# Patient Record
Sex: FEMALE | Race: WHITE | NOT HISPANIC OR LATINO | Employment: PART TIME | URBAN - METROPOLITAN AREA
[De-identification: names, ages, dates, MRNs, and addresses within clinical notes are randomized per-mention and may not be internally consistent; named-entity substitution may affect disease eponyms.]

---

## 2019-03-19 ENCOUNTER — TELEPHONE (OUTPATIENT)
Dept: UROLOGY | Facility: AMBULATORY SURGERY CENTER | Age: 19
End: 2019-03-19

## 2019-03-19 NOTE — TELEPHONE ENCOUNTER
Reason for appointment/Complaint/Diagnosis : kidney stone symptoms    Insurance:     History of Cancer? no                       If yes, what kind? Previous urologist?     None                  Records requested/where? No     Outside testing/where? No    Location Preference for office visit?  [place] or [place]

## 2019-03-27 ENCOUNTER — OFFICE VISIT (OUTPATIENT)
Dept: UROLOGY | Facility: MEDICAL CENTER | Age: 19
End: 2019-03-27
Payer: OTHER GOVERNMENT

## 2019-03-27 VITALS
HEART RATE: 83 BPM | DIASTOLIC BLOOD PRESSURE: 60 MMHG | BODY MASS INDEX: 24.17 KG/M2 | SYSTOLIC BLOOD PRESSURE: 90 MMHG | WEIGHT: 128 LBS | HEIGHT: 61 IN

## 2019-03-27 DIAGNOSIS — N20.0 KIDNEY STONES: Primary | ICD-10-CM

## 2019-03-27 LAB
POST-VOID RESIDUAL VOLUME, ML POC: 0 ML
SL AMB  POCT GLUCOSE, UA: ABNORMAL
SL AMB LEUKOCYTE ESTERASE,UA: ABNORMAL
SL AMB POCT BILIRUBIN,UA: ABNORMAL
SL AMB POCT BLOOD,UA: ABNORMAL
SL AMB POCT CLARITY,UA: CLEAR
SL AMB POCT COLOR,UA: YELLOW
SL AMB POCT KETONES,UA: ABNORMAL
SL AMB POCT NITRITE,UA: ABNORMAL
SL AMB POCT PH,UA: 7
SL AMB POCT SPECIFIC GRAVITY,UA: 1.02
SL AMB POCT URINE PROTEIN: ABNORMAL
SL AMB POCT UROBILINOGEN: 0.2

## 2019-03-27 PROCEDURE — 99203 OFFICE O/P NEW LOW 30 MIN: CPT | Performed by: UROLOGY

## 2019-03-27 PROCEDURE — 81003 URINALYSIS AUTO W/O SCOPE: CPT | Performed by: UROLOGY

## 2019-03-27 PROCEDURE — 51798 US URINE CAPACITY MEASURE: CPT | Performed by: UROLOGY

## 2019-03-27 RX ORDER — VENLAFAXINE 100 MG/1
TABLET ORAL
Refills: 5 | COMMUNITY
Start: 2019-02-06

## 2019-03-27 RX ORDER — CIPROFLOXACIN HYDROCHLORIDE 3.5 MG/ML
SOLUTION/ DROPS TOPICAL
COMMUNITY
Start: 2019-02-16 | End: 2019-03-27 | Stop reason: ALTCHOICE

## 2019-03-27 RX ORDER — AMOXICILLIN AND CLAVULANATE POTASSIUM 875; 125 MG/1; MG/1
TABLET, FILM COATED ORAL
Refills: 0 | COMMUNITY
Start: 2019-03-08 | End: 2019-03-27 | Stop reason: ALTCHOICE

## 2019-03-27 RX ORDER — NORETHINDRONE ACETATE/ETHINYL ESTRADIOL AND FERROUS FUMARATE 1.5-30(21)
KIT ORAL
Refills: 4 | COMMUNITY
Start: 2019-02-27

## 2019-03-27 RX ORDER — SULFAMETHOXAZOLE AND TRIMETHOPRIM 800; 160 MG/1; MG/1
TABLET ORAL
COMMUNITY
Start: 2019-03-17 | End: 2019-03-27 | Stop reason: ALTCHOICE

## 2019-03-27 RX ORDER — ONDANSETRON 4 MG/1
TABLET, ORALLY DISINTEGRATING ORAL
Refills: 0 | COMMUNITY
Start: 2019-03-20

## 2019-03-27 NOTE — ASSESSMENT & PLAN NOTE
The patient's illness with relapse in February was not due to the 2 mm stone in her left kidney  The diagnosis of a kidney stone is based on a KUB film alone  I will order renal ultrasound to get a more complete anatomical survey of the kidneys for stones  While a CT scan would be even more definitive, and a 77-year-old woman, I prefer to avoid the radiation exposure unless there is a compelling reason to get the CT scan

## 2019-03-27 NOTE — PROGRESS NOTES
Assessment/Plan:    Kidney stones  The patient's illness with relapse in February was not due to the 2 mm stone in her left kidney  The diagnosis of a kidney stone is based on a KUB film alone  I will order renal ultrasound to get a more complete anatomical survey of the kidneys for stones  While a CT scan would be even more definitive, and a 49-year-old woman, I prefer to avoid the radiation exposure unless there is a compelling reason to get the CT scan  Diagnoses and all orders for this visit:    Kidney stones  -     POCT urine dip auto non-scope  -     POCT Measure PVR  -     US retroperitoneal / kidney limited; Future    Other orders  -     Discontinue: amoxicillin-clavulanate (AUGMENTIN) 875-125 mg per tablet  -     DORINA FE 1 5/30 1 5-30 MG-MCG tablet  -     ondansetron (ZOFRAN-ODT) 4 mg disintegrating tablet  -     venlafaxine (EFFEXOR) 100 MG tablet  -     Discontinue: ciprofloxacin (CILOXAN) 0 3 % ophthalmic solution  -     Discontinue: sulfamethoxazole-trimethoprim (BACTRIM DS) 800-160 mg per tablet          Subjective:      Patient ID: Prudencio Sarah is a 19 y o  female  HPI  Renal Stone:  Feb 2019:  No fever  Nauseated with vomiting, urinary frequency for moderate amts  Pt was sick for over a week  No back pain or dysuria  No prior attacks  The patient works as a nurse's , 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  Treated with Bactrim at [facility]gent [facility] with Bactrim for possible urinary tract infection  Urinalysis that day shows microscopic blood but minimal white cells     An x-ray at Urgent Care  Report indicates a 2 mm stone in left lower pole  No personal hx of stones  PGF had stones  Urinary frequency:  The patient reports that she double voids fairly often and sometimes feels that she is unable to empty her bladder  PVR = 0  Accompaneid by [employer] Weems      The following portions of the patient's history were reviewed and updated as appropriate: allergies, current medications, past family history, past medical history, past social history, past surgical history and problem list     Review of Systems   Constitutional: Negative for activity change and fatigue  Respiratory: Negative for shortness of breath and wheezing  Cardiovascular: Negative for chest pain  Gastrointestinal: Negative for abdominal pain  Genitourinary: Negative for difficulty urinating, dysuria, frequency, hematuria and urgency  Musculoskeletal: Negative for back pain and gait problem  Skin: Negative  Allergic/Immunologic: Negative  Neurological: Negative  Psychiatric/Behavioral: Negative  Objective:      BP 90/60   Pulse 83   Ht 5' 1" (1 549 m)   Wt 58 1 kg (128 lb)   BMI 24 19 kg/m²          Physical Exam   Constitutional: She is oriented to person, place, and time  She appears well-developed and well-nourished  HENT:   Head: Normocephalic and atraumatic  Neck: Normal range of motion  Neck supple  Cardiovascular: Normal rate, regular rhythm and normal heart sounds  Pulmonary/Chest: Effort normal and breath sounds normal    Abdominal: Soft  Bowel sounds are normal  She exhibits no mass  There is no tenderness  There is no guarding  No CVA tenderness on either side  Musculoskeletal: Normal range of motion  Neurological: She is alert and oriented to person, place, and time  She has normal reflexes  Psychiatric: She has a normal mood and affect   Her behavior is normal  Judgment and thought content normal

## 2019-04-01 ENCOUNTER — HOSPITAL ENCOUNTER (OUTPATIENT)
Dept: ULTRASOUND IMAGING | Facility: HOSPITAL | Age: 19
Discharge: HOME | End: 2019-04-01
Attending: UROLOGY
Payer: OTHER GOVERNMENT

## 2019-04-01 DIAGNOSIS — N20.0 KIDNEY STONES: ICD-10-CM

## 2019-04-01 PROCEDURE — 76770 US EXAM ABDO BACK WALL COMP: CPT

## 2019-04-08 ENCOUNTER — TELEPHONE (OUTPATIENT)
Dept: UROLOGY | Facility: MEDICAL CENTER | Age: 19
End: 2019-04-08

## 2019-12-16 ENCOUNTER — OPTICAL OFFICE (OUTPATIENT)
Dept: URBAN - NONMETROPOLITAN AREA CLINIC 4 | Facility: CLINIC | Age: 19
Setting detail: OPHTHALMOLOGY
End: 2019-12-16
Payer: COMMERCIAL

## 2019-12-16 ENCOUNTER — DOCTOR'S OFFICE (OUTPATIENT)
Dept: URBAN - METROPOLITAN AREA CLINIC 125 | Facility: CLINIC | Age: 19
Setting detail: OPHTHALMOLOGY
End: 2019-12-16
Payer: COMMERCIAL

## 2019-12-16 DIAGNOSIS — H52.223: ICD-10-CM

## 2019-12-16 DIAGNOSIS — H52.13: ICD-10-CM

## 2019-12-16 PROCEDURE — 92015 DETERMINE REFRACTIVE STATE: CPT | Performed by: OPTOMETRIST

## 2019-12-16 PROCEDURE — 92310 CONTACT LENS FITTING OU: CPT | Performed by: OPTOMETRIST

## 2019-12-16 PROCEDURE — V2020 VISION SVCS FRAMES PURCHASES: HCPCS | Performed by: OPTOMETRIST

## 2019-12-16 PROCEDURE — 92004 COMPRE OPH EXAM NEW PT 1/>: CPT | Performed by: OPTOMETRIST

## 2019-12-16 PROCEDURE — V2103 SPHEROCYLINDR 4.00D/12-2.00D: HCPCS | Performed by: OPTOMETRIST

## 2019-12-16 ASSESSMENT — REFRACTION_CURRENTRX
OD_OVR_VA: 20/
OS_OVR_VA: 20/
OS_CYLINDER: 0.00
OD_CYLINDER: 0.00
OS_OVR_VA: 20/
OD_OVR_VA: 20/
OS_AXIS: 180
OS_OVR_VA: 20/
OD_OVR_VA: 20/
OD_SPHERE: -0.50
OS_SPHERE: -0.75
OD_AXIS: 180

## 2019-12-16 ASSESSMENT — REFRACTION_MANIFEST
OS_VA2: 20/
OS_VA1: 20/20
OD_VA1: 20/20
OS_SPHERE: -0.75
OS_VA3: 20/
OS_VA3: 20/
OD_VA3: 20/
OD_VA1: 20/
OS_VA1: 20/
OD_SPHERE: -0.50
OS_CYLINDER: -0.50
OD_AXIS: 090
OD_VA2: 20/
OD_CYLINDER: -0.50
OU_VA: 20/
OS_AXIS: 075
OD_VA3: 20/
OS_VA2: 20/
OU_VA: 20/20
OD_VA2: 20/

## 2019-12-16 ASSESSMENT — REFRACTION_AUTOREFRACTION
OS_CYLINDER: -0.50
OS_AXIS: 070
OD_AXIS: 095
OD_SPHERE: -0.50
OD_CYLINDER: -0.75
OS_SPHERE: -1.00

## 2019-12-16 ASSESSMENT — AXIALLENGTH_DERIVED
OD_AL: 24.1284
OD_AL: 24.0776
OS_AL: 23.8935
OS_AL: 23.9938

## 2019-12-16 ASSESSMENT — KERATOMETRY
OS_K1POWER_DIOPTERS: 43.25
OS_K2POWER_DIOPTERS: 44.25
OD_K2POWER_DIOPTERS: 43.50
OD_AXISANGLE_DEGREES: 172
OS_AXISANGLE_DEGREES: 174
OD_K1POWER_DIOPTERS: 42.50

## 2019-12-16 ASSESSMENT — SPHEQUIV_DERIVED
OS_SPHEQUIV: -1
OD_SPHEQUIV: -0.75
OD_SPHEQUIV: -0.875
OS_SPHEQUIV: -1.25

## 2019-12-16 ASSESSMENT — VISUAL ACUITY
OS_BCVA: 20/25
OD_BCVA: 20/25

## 2019-12-16 ASSESSMENT — CONFRONTATIONAL VISUAL FIELD TEST (CVF)
OS_FINDINGS: FULL
OD_FINDINGS: FULL

## 2020-04-01 ENCOUNTER — TELEPHONE (OUTPATIENT)
Dept: UROLOGY | Facility: MEDICAL CENTER | Age: 20
End: 2020-04-01

## 2020-04-03 ENCOUNTER — TELEPHONE (OUTPATIENT)
Dept: UROLOGY | Facility: MEDICAL CENTER | Age: 20
End: 2020-04-03